# Patient Record
Sex: MALE
[De-identification: names, ages, dates, MRNs, and addresses within clinical notes are randomized per-mention and may not be internally consistent; named-entity substitution may affect disease eponyms.]

---

## 2018-11-07 ENCOUNTER — HOSPITAL ENCOUNTER (OUTPATIENT)
Dept: HOSPITAL 25 - OREAST | Age: 68
Discharge: HOME | End: 2018-11-07
Attending: SPECIALIST
Payer: OTHER GOVERNMENT

## 2018-11-07 VITALS — DIASTOLIC BLOOD PRESSURE: 69 MMHG | SYSTOLIC BLOOD PRESSURE: 102 MMHG

## 2018-11-07 DIAGNOSIS — H25.811: Primary | ICD-10-CM

## 2018-11-07 DIAGNOSIS — I10: ICD-10-CM

## 2018-11-07 DIAGNOSIS — Z87.891: ICD-10-CM

## 2018-11-07 DIAGNOSIS — H40.1334: ICD-10-CM

## 2018-11-07 DIAGNOSIS — H35.371: ICD-10-CM

## 2018-11-07 DIAGNOSIS — E78.00: ICD-10-CM

## 2018-11-07 PROCEDURE — V2632 POST CHMBR INTRAOCULAR LENS: HCPCS

## 2018-11-07 NOTE — OP
DATE OF OPERATION:  11/07/2018.

 

YOB: 1950.

 

SURGEON:  Harry Archibald M.D.

 

PREOPERATIVE DIAGNOSIS:  Cataract right eye.

 

POSTOPERATIVE DIAGNOSIS:  Cataract right eye.

 

OPERATIVE PROCEDURE:  Extracapsular cataract extraction with intraocular lens implant right eye.

 

PROCEDURE:  The patient was brought to the operating room after being given 1/2% Alcaine with epineph
rine drops in the preoperative area.  The eye was prepped and draped in the usual sterile fashion.  S
terile drape and eyelid speculum were placed.  Again, topical 1/2% Alcaine with epinephrine was given
.  A paracentesis incision was made at the 9 o'clock position with the No.75 blade.  Clear cornea inc
ision 2.2 x 2.2-mm was created at the 12 o'clock position starting at the anterior limbus using the 2
.2-mm keratome.  The anterior chamber was irrigated with 0.4 mL of 1% non-preservative intracameral l
idocaine and filled with DisCoVisc.  A capsulorrhexis was completed using the cystotome and the Utrat
a forceps. Hydrodissection was performed with balanced salt solution.  The lens nucleus was removed w
ith the Phacoemulsification handpiece without incident.  Cortex was removed with the irrigation-aspir
ation handpiece.  The capsular bag was re-inflated using DisCoVisc and an SN60WF 16.5 implant was ins
erted with the shooter.  The irrigation-aspiration handpiece was used to remove all residual DisCoVis
c.  The eye was refilled with balanced salt solution and the wound checked and found to be watertight
.  Topical Maxitrol drops were given.

 

 [id number]/[facility] #: [id number]

## 2018-11-14 ENCOUNTER — HOSPITAL ENCOUNTER (OUTPATIENT)
Dept: HOSPITAL 25 - OREAST | Age: 68
Discharge: HOME | End: 2018-11-14
Attending: SPECIALIST
Payer: OTHER GOVERNMENT

## 2018-11-14 VITALS — SYSTOLIC BLOOD PRESSURE: 113 MMHG | DIASTOLIC BLOOD PRESSURE: 74 MMHG

## 2018-11-14 DIAGNOSIS — Z87.891: ICD-10-CM

## 2018-11-14 DIAGNOSIS — Z86.73: ICD-10-CM

## 2018-11-14 DIAGNOSIS — H35.371: ICD-10-CM

## 2018-11-14 DIAGNOSIS — I10: ICD-10-CM

## 2018-11-14 DIAGNOSIS — M50.10: ICD-10-CM

## 2018-11-14 DIAGNOSIS — H25.812: Primary | ICD-10-CM

## 2018-11-14 PROCEDURE — V2632 POST CHMBR INTRAOCULAR LENS: HCPCS

## 2018-11-14 NOTE — OP
DATE OF OPERATION:  11/14/2018.

 

YOB: 1950.

 

SURGEON:  Harry Archibald M.D.

 

PREOPERATIVE DIAGNOSIS:  Cataract left eye.

 

POSTOPERATIVE DIAGNOSIS:  Cataract left eye.

 

OPERATIVE PROCEDURE:  Extracapsular cataract extraction with intraocular lens implant left eye.

 

PROCEDURE:  The patient was brought to the operating room after being given 1/2% Alcaine with epineph
rine drops in the preoperative area.  The eye was prepped and draped in the usual sterile fashion.  S
terile drape and eyelid speculum were placed.  Again, topical 1/2% Alcaine with epinephrine was given
.  A paracentesis incision was made at the 3 o'clock position with the No.75 blade.  Clear cornea inc
ision 2.2 x 2.2-mm was created at the 6 o'clock position starting at the anterior limbus using the 2.
2-mm keratome.  The anterior chamber was irrigated with 0.4 mL of 1% non-preservative intracameral li
docaine and filled with DisCoVisc.  A capsulorrhexis was completed using the cystotome and the Utrata
 forceps. Hydrodissection was performed with balanced salt solution.  The lens nucleus was removed wi
th the Phacoemulsification handpiece without incident.  Cortex was removed with the irrigation-aspira
tion handpiece.  The capsular bag was re-inflated using DisCoVisc and an SN60WF 17.5 implant was inse
rted with the shooter.  The irrigation-aspiration handpiece was used to remove all residual DisCoVisc
.  The eye was refilled with balanced salt solution and the wound checked and found to be watertight.
  Topical Maxitrol drops were given.

 

 [id number]/[id number] #: [id number]